# Patient Record
Sex: FEMALE | Race: OTHER | Employment: FULL TIME | URBAN - METROPOLITAN AREA
[De-identification: names, ages, dates, MRNs, and addresses within clinical notes are randomized per-mention and may not be internally consistent; named-entity substitution may affect disease eponyms.]

---

## 2021-04-11 PROBLEM — O99.891 BACTERIURIA IN PREGNANCY: Status: ACTIVE | Noted: 2021-04-11

## 2021-04-11 PROBLEM — Z34.01 ENCOUNTER FOR SUPERVISION OF LOW-RISK FIRST PREGNANCY IN FIRST TRIMESTER: Status: ACTIVE | Noted: 2021-04-11

## 2021-04-11 PROBLEM — R82.71 GBS BACTERIURIA: Status: ACTIVE | Noted: 2021-04-11

## 2021-04-11 PROBLEM — R82.71 BACTERIURIA IN PREGNANCY: Status: ACTIVE | Noted: 2021-04-11

## 2021-05-12 PROBLEM — O99.891 BACTERIURIA IN PREGNANCY: Status: RESOLVED | Noted: 2021-04-11 | Resolved: 2021-05-12

## 2021-05-12 PROBLEM — R82.71 GBS BACTERIURIA: Status: RESOLVED | Noted: 2021-04-11 | Resolved: 2021-05-12

## 2021-05-12 PROBLEM — R82.71 BACTERIURIA IN PREGNANCY: Status: RESOLVED | Noted: 2021-04-11 | Resolved: 2021-05-12

## 2021-06-30 PROBLEM — O09.92 HIGH-RISK PREGNANCY, SECOND TRIMESTER: Status: ACTIVE | Noted: 2021-04-11

## 2021-06-30 PROBLEM — O99.212 OBESITY AFFECTING PREGNANCY IN SECOND TRIMESTER: Status: ACTIVE | Noted: 2021-06-30

## 2021-07-29 PROBLEM — O24.410 DIET CONTROLLED WHITE CLASSIFICATION A1 GESTATIONAL DIABETES MELLITUS (GDM): Status: ACTIVE | Noted: 2021-07-29

## 2021-09-30 ENCOUNTER — HOSPITAL ENCOUNTER (INPATIENT)
Age: 26
LOS: 2 days | Discharge: HOME | End: 2021-10-02
Attending: OBSTETRICS & GYNECOLOGY | Admitting: OBSTETRICS & GYNECOLOGY
Payer: COMMERCIAL

## 2021-09-30 ENCOUNTER — ANESTHESIA EVENT (OUTPATIENT)
Dept: LABOR AND DELIVERY | Age: 26
End: 2021-09-30
Payer: COMMERCIAL

## 2021-09-30 ENCOUNTER — ANESTHESIA (OUTPATIENT)
Dept: LABOR AND DELIVERY | Age: 26
End: 2021-09-30
Payer: COMMERCIAL

## 2021-09-30 DIAGNOSIS — O09.92 HIGH-RISK PREGNANCY, SECOND TRIMESTER: ICD-10-CM

## 2021-09-30 DIAGNOSIS — Z98.891 STATUS POST CESAREAN SECTION: Primary | ICD-10-CM

## 2021-09-30 PROBLEM — R10.9 ABDOMINAL PAIN DURING PREGNANCY IN THIRD TRIMESTER: Status: ACTIVE | Noted: 2021-09-30

## 2021-09-30 PROBLEM — O26.893 ABDOMINAL PAIN DURING PREGNANCY IN THIRD TRIMESTER: Status: ACTIVE | Noted: 2021-09-30

## 2021-09-30 LAB
ABO + RH BLD: NORMAL
ARTERIAL PATENCY WRIST A: ABNORMAL
ARTERIAL PATENCY WRIST A: ABNORMAL
BASE DEFICIT BLD-SCNC: 1.9 MMOL/L
BASE EXCESS BLD CALC-SCNC: 0.6 MMOL/L
BLOOD GROUP ANTIBODIES SERPL: NORMAL
ERYTHROCYTE [DISTWIDTH] IN BLOOD BY AUTOMATED COUNT: 14.4 % (ref 11.9–14.6)
GAS FLOW.O2 O2 DELIVERY SYS: ABNORMAL L/MIN
GAS FLOW.O2 O2 DELIVERY SYS: ABNORMAL L/MIN
HCO3 BLD-SCNC: 27.1 MMOL/L (ref 22–26)
HCO3 BLDV-SCNC: 22.3 MMOL/L (ref 23–28)
HCT VFR BLD AUTO: 40.7 % (ref 35.8–46.3)
HGB BLD-MCNC: 13.5 G/DL (ref 11.7–15.4)
MCH RBC QN AUTO: 28.1 PG (ref 26.1–32.9)
MCHC RBC AUTO-ENTMCNC: 33.2 G/DL (ref 31.4–35)
MCV RBC AUTO: 84.6 FL (ref 79.6–97.8)
NRBC # BLD: 0 K/UL (ref 0–0.2)
PCO2 BLDCO: 36 MMHG (ref 32–68)
PCO2 BLDCO: 49 MMHG (ref 32–68)
PH BLDCO: 7.35 [PH] (ref 7.15–7.38)
PH BLDCO: 7.4 [PH] (ref 7.15–7.38)
PLATELET # BLD AUTO: 324 K/UL (ref 150–450)
PMV BLD AUTO: 9.6 FL (ref 9.4–12.3)
PO2 BLDCO: 11 MMHG
PO2 BLDCO: 28 MMHG
RBC # BLD AUTO: 4.81 M/UL (ref 4.05–5.2)
SAO2 % BLD: 9.7 % (ref 95–98)
SAO2 % BLDV: 53.7 % (ref 65–88)
SERVICE CMNT-IMP: ABNORMAL
SERVICE CMNT-IMP: ABNORMAL
SPECIMEN EXP DATE BLD: NORMAL
SPECIMEN TYPE: ABNORMAL
SPECIMEN TYPE: ABNORMAL
WBC # BLD AUTO: 12 K/UL (ref 4.3–11.1)

## 2021-09-30 PROCEDURE — 74011250636 HC RX REV CODE- 250/636: Performed by: REGISTERED NURSE

## 2021-09-30 PROCEDURE — 76060000078 HC EPIDURAL ANESTHESIA: Performed by: OBSTETRICS & GYNECOLOGY

## 2021-09-30 PROCEDURE — 74011250637 HC RX REV CODE- 250/637: Performed by: ANESTHESIOLOGY

## 2021-09-30 PROCEDURE — 77030011943: Performed by: OBSTETRICS & GYNECOLOGY

## 2021-09-30 PROCEDURE — 74011000250 HC RX REV CODE- 250: Performed by: ANESTHESIOLOGY

## 2021-09-30 PROCEDURE — 74011250636 HC RX REV CODE- 250/636: Performed by: OBSTETRICS & GYNECOLOGY

## 2021-09-30 PROCEDURE — 74011000250 HC RX REV CODE- 250: Performed by: OBSTETRICS & GYNECOLOGY

## 2021-09-30 PROCEDURE — 74011250636 HC RX REV CODE- 250/636: Performed by: ANESTHESIOLOGY

## 2021-09-30 PROCEDURE — 85027 COMPLETE CBC AUTOMATED: CPT

## 2021-09-30 PROCEDURE — 77030002933 HC SUT MCRYL J&J -A: Performed by: OBSTETRICS & GYNECOLOGY

## 2021-09-30 PROCEDURE — 76010000391 HC C SECN FIRST 1 HR: Performed by: OBSTETRICS & GYNECOLOGY

## 2021-09-30 PROCEDURE — A4300 CATH IMPL VASC ACCESS PORTAL: HCPCS | Performed by: ANESTHESIOLOGY

## 2021-09-30 PROCEDURE — 86901 BLOOD TYPING SEROLOGIC RH(D): CPT

## 2021-09-30 PROCEDURE — 77030034696 HC CATH URETH FOL 2W BARD -A

## 2021-09-30 PROCEDURE — 77030032490 HC SLV COMPR SCD KNE COVD -B: Performed by: OBSTETRICS & GYNECOLOGY

## 2021-09-30 PROCEDURE — 82803 BLOOD GASES ANY COMBINATION: CPT

## 2021-09-30 PROCEDURE — 59510 CESAREAN DELIVERY: CPT | Performed by: OBSTETRICS & GYNECOLOGY

## 2021-09-30 PROCEDURE — 77030014125 HC TY EPDRL BBMI -B: Performed by: ANESTHESIOLOGY

## 2021-09-30 PROCEDURE — 74011000258 HC RX REV CODE- 258: Performed by: OBSTETRICS & GYNECOLOGY

## 2021-09-30 PROCEDURE — 77030031139 HC SUT VCRL2 J&J -A: Performed by: OBSTETRICS & GYNECOLOGY

## 2021-09-30 PROCEDURE — 65270000029 HC RM PRIVATE

## 2021-09-30 PROCEDURE — 76010000392 HC C SECN EA ADDL 0.5 HR: Performed by: OBSTETRICS & GYNECOLOGY

## 2021-09-30 PROCEDURE — 75410000003 HC RECOV DEL/VAG/CSECN EA 0.5 HR: Performed by: OBSTETRICS & GYNECOLOGY

## 2021-09-30 PROCEDURE — 77030002966 HC SUT PDS J&J -A: Performed by: OBSTETRICS & GYNECOLOGY

## 2021-09-30 PROCEDURE — 74011000250 HC RX REV CODE- 250: Performed by: REGISTERED NURSE

## 2021-09-30 PROCEDURE — 99284 EMERGENCY DEPT VISIT MOD MDM: CPT | Performed by: OBSTETRICS & GYNECOLOGY

## 2021-09-30 PROCEDURE — 2709999900 HC NON-CHARGEABLE SUPPLY: Performed by: OBSTETRICS & GYNECOLOGY

## 2021-09-30 RX ORDER — BUTORPHANOL TARTRATE 2 MG/ML
1 INJECTION INTRAMUSCULAR; INTRAVENOUS
Status: DISCONTINUED | OUTPATIENT
Start: 2021-09-30 | End: 2021-09-30 | Stop reason: HOSPADM

## 2021-09-30 RX ORDER — SODIUM CHLORIDE 0.9 % (FLUSH) 0.9 %
5-40 SYRINGE (ML) INJECTION EVERY 8 HOURS
Status: DISCONTINUED | OUTPATIENT
Start: 2021-09-30 | End: 2021-10-01

## 2021-09-30 RX ORDER — LIDOCAINE HYDROCHLORIDE AND EPINEPHRINE 15; 5 MG/ML; UG/ML
INJECTION, SOLUTION EPIDURAL
Status: COMPLETED | OUTPATIENT
Start: 2021-09-30 | End: 2021-09-30

## 2021-09-30 RX ORDER — OXYTOCIN/RINGER'S LACTATE 30/500 ML
0-20 PLASTIC BAG, INJECTION (ML) INTRAVENOUS
Status: DISCONTINUED | OUTPATIENT
Start: 2021-09-30 | End: 2021-10-01

## 2021-09-30 RX ORDER — LIDOCAINE HYDROCHLORIDE 10 MG/ML
1 INJECTION INFILTRATION; PERINEURAL
Status: DISCONTINUED | OUTPATIENT
Start: 2021-09-30 | End: 2021-09-30 | Stop reason: HOSPADM

## 2021-09-30 RX ORDER — KETOROLAC TROMETHAMINE 30 MG/ML
INJECTION, SOLUTION INTRAMUSCULAR; INTRAVENOUS AS NEEDED
Status: DISCONTINUED | OUTPATIENT
Start: 2021-09-30 | End: 2021-09-30 | Stop reason: HOSPADM

## 2021-09-30 RX ORDER — ONDANSETRON 2 MG/ML
INJECTION INTRAMUSCULAR; INTRAVENOUS AS NEEDED
Status: DISCONTINUED | OUTPATIENT
Start: 2021-09-30 | End: 2021-09-30 | Stop reason: HOSPADM

## 2021-09-30 RX ORDER — CEFAZOLIN SODIUM/WATER 2 G/20 ML
2 SYRINGE (ML) INTRAVENOUS ONCE
Status: COMPLETED | OUTPATIENT
Start: 2021-09-30 | End: 2021-10-01

## 2021-09-30 RX ORDER — METOCLOPRAMIDE HYDROCHLORIDE 5 MG/ML
10 INJECTION INTRAMUSCULAR; INTRAVENOUS ONCE
Status: COMPLETED | OUTPATIENT
Start: 2021-09-30 | End: 2021-09-30

## 2021-09-30 RX ORDER — MORPHINE SULFATE 0.5 MG/ML
INJECTION, SOLUTION EPIDURAL; INTRATHECAL; INTRAVENOUS AS NEEDED
Status: DISCONTINUED | OUTPATIENT
Start: 2021-09-30 | End: 2021-09-30 | Stop reason: HOSPADM

## 2021-09-30 RX ORDER — LIDOCAINE HYDROCHLORIDE AND EPINEPHRINE 15; 5 MG/ML; UG/ML
INJECTION, SOLUTION EPIDURAL AS NEEDED
Status: DISCONTINUED | OUTPATIENT
Start: 2021-09-30 | End: 2021-09-30 | Stop reason: HOSPADM

## 2021-09-30 RX ORDER — OXYTOCIN/RINGER'S LACTATE 30/500 ML
87.3 PLASTIC BAG, INJECTION (ML) INTRAVENOUS AS NEEDED
Status: DISCONTINUED | OUTPATIENT
Start: 2021-09-30 | End: 2021-10-01

## 2021-09-30 RX ORDER — SODIUM CHLORIDE 0.9 % (FLUSH) 0.9 %
5-40 SYRINGE (ML) INJECTION AS NEEDED
Status: DISCONTINUED | OUTPATIENT
Start: 2021-09-30 | End: 2021-10-01

## 2021-09-30 RX ORDER — MINERAL OIL
120 OIL (ML) ORAL
Status: DISCONTINUED | OUTPATIENT
Start: 2021-09-30 | End: 2021-09-30 | Stop reason: HOSPADM

## 2021-09-30 RX ORDER — TRISODIUM CITRATE DIHYDRATE AND CITRIC ACID MONOHYDRATE 500; 334 MG/5ML; MG/5ML
30 SOLUTION ORAL ONCE
Status: COMPLETED | OUTPATIENT
Start: 2021-09-30 | End: 2021-09-30

## 2021-09-30 RX ORDER — OXYTOCIN/RINGER'S LACTATE 30/500 ML
10 PLASTIC BAG, INJECTION (ML) INTRAVENOUS AS NEEDED
Status: DISCONTINUED | OUTPATIENT
Start: 2021-09-30 | End: 2021-10-01

## 2021-09-30 RX ORDER — SODIUM CHLORIDE, SODIUM LACTATE, POTASSIUM CHLORIDE, CALCIUM CHLORIDE 600; 310; 30; 20 MG/100ML; MG/100ML; MG/100ML; MG/100ML
INJECTION, SOLUTION INTRAVENOUS
Status: DISCONTINUED | OUTPATIENT
Start: 2021-09-30 | End: 2021-09-30 | Stop reason: HOSPADM

## 2021-09-30 RX ORDER — ROPIVACAINE HYDROCHLORIDE 2 MG/ML
INJECTION, SOLUTION EPIDURAL; INFILTRATION; PERINEURAL
Status: DISCONTINUED | OUTPATIENT
Start: 2021-09-30 | End: 2021-09-30 | Stop reason: HOSPADM

## 2021-09-30 RX ORDER — DEXTROSE, SODIUM CHLORIDE, SODIUM LACTATE, POTASSIUM CHLORIDE, AND CALCIUM CHLORIDE 5; .6; .31; .03; .02 G/100ML; G/100ML; G/100ML; G/100ML; G/100ML
125 INJECTION, SOLUTION INTRAVENOUS CONTINUOUS
Status: DISCONTINUED | OUTPATIENT
Start: 2021-09-30 | End: 2021-10-01

## 2021-09-30 RX ORDER — ONDANSETRON 2 MG/ML
4 INJECTION INTRAMUSCULAR; INTRAVENOUS
Status: DISCONTINUED | OUTPATIENT
Start: 2021-09-30 | End: 2021-09-30 | Stop reason: HOSPADM

## 2021-09-30 RX ORDER — EPHEDRINE SULFATE/0.9% NACL/PF 50 MG/5 ML
SYRINGE (ML) INTRAVENOUS AS NEEDED
Status: DISCONTINUED | OUTPATIENT
Start: 2021-09-30 | End: 2021-09-30 | Stop reason: HOSPADM

## 2021-09-30 RX ORDER — LIDOCAINE HYDROCHLORIDE 20 MG/ML
JELLY TOPICAL
Status: DISCONTINUED | OUTPATIENT
Start: 2021-09-30 | End: 2021-09-30 | Stop reason: HOSPADM

## 2021-09-30 RX ORDER — CALCIUM CARBONATE 200(500)MG
400 TABLET,CHEWABLE ORAL
Status: DISCONTINUED | OUTPATIENT
Start: 2021-09-30 | End: 2021-09-30 | Stop reason: HOSPADM

## 2021-09-30 RX ORDER — ROPIVACAINE HYDROCHLORIDE 2 MG/ML
INJECTION, SOLUTION EPIDURAL; INFILTRATION; PERINEURAL AS NEEDED
Status: DISCONTINUED | OUTPATIENT
Start: 2021-09-30 | End: 2021-09-30 | Stop reason: HOSPADM

## 2021-09-30 RX ORDER — OXYTOCIN/RINGER'S LACTATE 30/500 ML
PLASTIC BAG, INJECTION (ML) INTRAVENOUS
Status: DISCONTINUED | OUTPATIENT
Start: 2021-09-30 | End: 2021-09-30 | Stop reason: HOSPADM

## 2021-09-30 RX ADMIN — MORPHINE SULFATE 1 MG: 0.5 INJECTION, SOLUTION EPIDURAL; INTRATHECAL; INTRAVENOUS at 21:28

## 2021-09-30 RX ADMIN — SODIUM CITRATE AND CITRIC ACID MONOHYDRATE 30 ML: 500; 334 SOLUTION ORAL at 20:54

## 2021-09-30 RX ADMIN — OXYTOCIN 500 ML/HR: 10 INJECTION, SOLUTION INTRAMUSCULAR; INTRAVENOUS at 21:23

## 2021-09-30 RX ADMIN — Medication 1 MILLI-UNITS/MIN: at 15:54

## 2021-09-30 RX ADMIN — SODIUM CHLORIDE, SODIUM LACTATE, POTASSIUM CHLORIDE, AND CALCIUM CHLORIDE: 600; 310; 30; 20 INJECTION, SOLUTION INTRAVENOUS at 21:04

## 2021-09-30 RX ADMIN — PHENYLEPHRINE HYDROCHLORIDE 160 MCG: 10 INJECTION INTRAVENOUS at 21:38

## 2021-09-30 RX ADMIN — Medication 10 MG: at 18:27

## 2021-09-30 RX ADMIN — CEFAZOLIN SODIUM 2 G: 10 INJECTION, POWDER, FOR SOLUTION INTRAVENOUS at 20:54

## 2021-09-30 RX ADMIN — LIDOCAINE HYDROCHLORIDE AND EPINEPHRINE 7 ML: 15; 5 INJECTION, SOLUTION EPIDURAL at 21:09

## 2021-09-30 RX ADMIN — LIDOCAINE HYDROCHLORIDE,EPINEPHRINE BITARTRATE 3 ML: 15; .005 INJECTION, SOLUTION EPIDURAL; INFILTRATION; INTRACAUDAL; PERINEURAL at 18:02

## 2021-09-30 RX ADMIN — ROPIVACAINE HYDROCHLORIDE 10 ML/HR: 2 INJECTION, SOLUTION EPIDURAL; INFILTRATION; PERINEURAL at 18:15

## 2021-09-30 RX ADMIN — SODIUM CHLORIDE 2.5 MILLION UNITS: 9 INJECTION, SOLUTION INTRAVENOUS at 19:54

## 2021-09-30 RX ADMIN — Medication 8 ML: at 18:10

## 2021-09-30 RX ADMIN — Medication 5 MG: at 18:29

## 2021-09-30 RX ADMIN — MORPHINE SULFATE 1 MG: 0.5 INJECTION, SOLUTION EPIDURAL; INTRATHECAL; INTRAVENOUS at 21:43

## 2021-09-30 RX ADMIN — AZITHROMYCIN MONOHYDRATE 500 MG: 500 INJECTION, POWDER, LYOPHILIZED, FOR SOLUTION INTRAVENOUS at 20:54

## 2021-09-30 RX ADMIN — PHENYLEPHRINE HYDROCHLORIDE 160 MCG: 10 INJECTION INTRAVENOUS at 21:15

## 2021-09-30 RX ADMIN — SODIUM CHLORIDE, SODIUM LACTATE, POTASSIUM CHLORIDE, AND CALCIUM CHLORIDE: 600; 310; 30; 20 INJECTION, SOLUTION INTRAVENOUS at 21:07

## 2021-09-30 RX ADMIN — KETOROLAC TROMETHAMINE 30 MG: 30 INJECTION, SOLUTION INTRAMUSCULAR; INTRAVENOUS at 21:51

## 2021-09-30 RX ADMIN — PHENYLEPHRINE HYDROCHLORIDE 160 MCG: 10 INJECTION INTRAVENOUS at 21:49

## 2021-09-30 RX ADMIN — SODIUM CHLORIDE 5 MILLION UNITS: 900 INJECTION INTRAVENOUS at 15:48

## 2021-09-30 RX ADMIN — ONDANSETRON 4 MG: 2 INJECTION INTRAMUSCULAR; INTRAVENOUS at 21:43

## 2021-09-30 RX ADMIN — PHENYLEPHRINE HYDROCHLORIDE 160 MCG: 10 INJECTION INTRAVENOUS at 21:30

## 2021-09-30 RX ADMIN — METOCLOPRAMIDE 10 MG: 5 INJECTION, SOLUTION INTRAMUSCULAR; INTRAVENOUS at 20:54

## 2021-09-30 RX ADMIN — SODIUM CHLORIDE, SODIUM LACTATE, POTASSIUM CHLORIDE, CALCIUM CHLORIDE, AND DEXTROSE MONOHYDRATE 125 ML/HR: 600; 310; 30; 20; 5 INJECTION, SOLUTION INTRAVENOUS at 15:50

## 2021-09-30 RX ADMIN — MORPHINE SULFATE 1 MG: 0.5 INJECTION, SOLUTION EPIDURAL; INTRATHECAL; INTRAVENOUS at 21:35

## 2021-09-30 RX ADMIN — Medication 2 MILLI-UNITS/MIN: at 16:33

## 2021-09-30 RX ADMIN — LIDOCAINE HYDROCHLORIDE AND EPINEPHRINE 5 ML: 15; 5 INJECTION, SOLUTION EPIDURAL at 21:02

## 2021-09-30 NOTE — PROGRESS NOTES
Anesthesia in room. Procedure reviewed. Consents signed per MD.   1800 Pt to side of bed.   1807 Epidural cath in place. Test dose per MD. Serial BP as documented. 1815 Pt to right hip tilt.

## 2021-09-30 NOTE — PROGRESS NOTES
1845 MD updated on FHR tracing with continued variables with contractions and moderate variability between contractions. . OK to place toure. Pt tolerated well. SVE as documented.

## 2021-09-30 NOTE — ANESTHESIA PROCEDURE NOTES
Epidural Block    Patient location during procedure: OB  Start time: 9/30/2021 6:02 PM  End time: 9/30/2021 6:07 PM  Reason for block: labor epidural  Staffing  Performed: attending   Anesthesiologist: Dexter Carson MD  Preanesthetic Checklist  Completed: patient identified, IV checked, site marked, risks and benefits discussed, surgical consent, monitors and equipment checked, pre-op evaluation and timeout performed  Block Placement  Patient position: sitting  Prep: ChloraPrep  Sterility prep: mask and hand  Sedation level: no sedation  Patient monitoring: frequent blood pressure checks, heart rate and continuous pulse oximetry  Approach: right paramedian  Location: lumbar  Lumbar location: L2-L3  Epidural  Loss of resistance technique: air  Guidance: landmark technique  Needle  Needle type: Tuohy   Needle gauge: 17 G  Needle insertion depth: 4 cm  Catheter type: end hole  Catheter size: 19 G  Catheter at skin depth: 10 cm  Catheter securement method: surgical tape, liquid medical adhesive and clear occlusive dressing  Test dose: negative  Medications Administered  Lidocaine-EPINEPHrine (XYLOCAINE) 1.5 %-1:200,000 Epidural, 3 mL  Assessment  Block outcome: pain improved  Procedure assessment: patient tolerated procedure well with no immediate complications  Additional Notes  TO 1800

## 2021-09-30 NOTE — H&P
History & Physical    Name: Dodie Nice MRN: [id number]  SSN: [id number]    YOB: 1995  Age: 23 y.o. Sex: female        Subjective: Ctxs  24 yo G1 at 37wks presents c/o ctxs. Denies lof or vb. Reports normal FM. Pregnancy has been c/b GBS positivity, GDM diet controlled, and obesity. Estimated Date of Delivery: 21  OB History    Para Term  AB Living   1 0 0 0 0 0   SAB TAB Ectopic Molar Multiple Live Births   0 0 0   0        # Outcome Date GA Lbr Bruno/2nd Weight Sex Delivery Anes PTL Lv   1 Current                History reviewed. No pertinent past medical history. Past Surgical History:   Procedure Laterality Date    HX WISDOM TEETH EXTRACTION       Social History     Occupational History    Not on file   Tobacco Use    Smoking status: Never Smoker    Smokeless tobacco: Never Used   Vaping Use    Vaping Use: Never used   Substance and Sexual Activity    Alcohol use: Not Currently    Drug use: Not Currently     Comment: last smoked marijuana before + UCG    Sexual activity: Yes     Partners: Male     Birth control/protection: None     Family History   Problem Relation Age of Onset    Hypertension Father     Hypertension Paternal Grandfather     Cancer Paternal Grandfather         skin    Ovarian Cancer Neg Hx     Colon Cancer Neg Hx     Breast Cancer Neg Hx        No Known Allergies  Prior to Admission medications    Medication Sig Start Date End Date Taking? Authorizing Provider   famotidine (PEPCID) 10 mg tablet Take 10 mg by mouth two (2) times a day. Yes Provider, Historical   prenatal vit/iron fum/folic ac (PRENATAL 1+1 PO) Take  by mouth. Yes Provider, Historical   BIOTIN PO Take  by mouth. Yes Provider, Historical   calcium-cholecalciferol, d3, (CALCIUM 600 + D) 600-125 mg-unit tab Take  by mouth. Yes Provider, Historical   lancets misc Check blood sugar 4 x/day.  Fasting before breakfast then 1 hour after the 1st bite of each meal (breakfast,lunch,dinner). 8/4/21   Pickell, Morrell Paget, MD   glucose blood VI test strips (blood glucose test) strip Check blood sugars 4 times a day. Fasting before breakfast, then 1 hour after the first bite of each meal.     8/4/21   Alex Barreto MD   Blood-Glucose Meter monitoring kit Check blood sugar 4 x/day. Fasting before breakfast then 1 hour after the 1st bite of meal (breakfast,lunch,dinner). 8/4/21   Alex Barreto MD        Review of Systems: Pertinent items are noted in HPI. 10 point ROS is otherwise positive for general discomforts in labor. Objective:     Vitals:  Vitals:    09/30/21 1418 09/30/21 1420   BP: 123/69    Temp: 98.2 °F (36.8 °C)    Weight:  99.3 kg (219 lb)   Height:  5' 5\" (1.651 m)        Physical Exam:  Patient without distress. Heart: Regular rate and rhythm  Lung: clear to auscultation throughout lung fields and normal respiratory effort  Abdomen: soft, nontender  Fundus: soft and non tender  Perineum: blood absent, amniotic fluid absent  Cervical Exam: 3 cm dilated    90% effaced    -3 station    Lower Extremities:  - Edema 1+  Membranes:  Intact  Fetal Heart Rate: Baseline: 115-125 per minute  Variability: moderate  Accelerations: yes  Decelerations: yes, 1 min to 90s  Uterine contractions: irregular    Prenatal Labs:   Lab Results   Component Value Date/Time    GrBStrep, External POSITIVE 03/09/2021 12:00 AM         Assessment/Plan:  22 yo G1 at 40 wks presents with ctxs. Cvx unchanged from prior exam. I reviewed and interpreted the NST as a category 2 tracing. Active Problems:    * No active hospital problems. *       Plan:   Spontaneous decel x 1 minute noted and reviewed with . Agreed to admit for augmentation of labor. Start pitocin. Epidural prn. Glucose q 2 hours in labor; treat if greater than 150. PCN per IV for GBS prophylaxis.

## 2021-09-30 NOTE — PROGRESS NOTES
Chart reviewed and met with patient to discuss plan of IOL. Will start pitocin and plan for AROM. Last growth baby growing large with AC > 90 and EFW of 7#11oz. GBS + start PCN.     Marge Kimble, DO

## 2021-09-30 NOTE — PROGRESS NOTES
SVE cat 2 with occasional variable, but overall reassuring   SVE 5/90/-2, AROM with scant fluid. FSE placed. Continue pitocin.      Marge Kimble, DO

## 2021-09-30 NOTE — PROGRESS NOTES
PT admitted for labor in room 432. IV started, labs drawn and sent. Plan of care reviewed. Pt and  verbalized understanding. Consent signed for Blood Products if needed.

## 2021-10-01 LAB
HCT VFR BLD AUTO: 33.6 % (ref 35.8–46.3)
HGB BLD-MCNC: 10.7 G/DL (ref 11.7–15.4)

## 2021-10-01 PROCEDURE — 36415 COLL VENOUS BLD VENIPUNCTURE: CPT

## 2021-10-01 PROCEDURE — 85018 HEMOGLOBIN: CPT

## 2021-10-01 PROCEDURE — 74011250637 HC RX REV CODE- 250/637: Performed by: OBSTETRICS & GYNECOLOGY

## 2021-10-01 PROCEDURE — 65270000029 HC RM PRIVATE

## 2021-10-01 PROCEDURE — 74011250636 HC RX REV CODE- 250/636: Performed by: ANESTHESIOLOGY

## 2021-10-01 PROCEDURE — 74011250637 HC RX REV CODE- 250/637: Performed by: ANESTHESIOLOGY

## 2021-10-01 PROCEDURE — 2709999900 HC NON-CHARGEABLE SUPPLY

## 2021-10-01 PROCEDURE — 74011000250 HC RX REV CODE- 250: Performed by: ANESTHESIOLOGY

## 2021-10-01 RX ORDER — OXYCODONE HYDROCHLORIDE 5 MG/1
10 TABLET ORAL
Status: DISCONTINUED | OUTPATIENT
Start: 2021-10-01 | End: 2021-10-02 | Stop reason: HOSPADM

## 2021-10-01 RX ORDER — ACETAMINOPHEN 325 MG/1
650 TABLET ORAL
Status: DISCONTINUED | OUTPATIENT
Start: 2021-10-01 | End: 2021-10-02 | Stop reason: HOSPADM

## 2021-10-01 RX ORDER — DIPHENHYDRAMINE HYDROCHLORIDE 50 MG/ML
12.5 INJECTION, SOLUTION INTRAMUSCULAR; INTRAVENOUS
Status: DISCONTINUED | OUTPATIENT
Start: 2021-10-01 | End: 2021-10-01 | Stop reason: ALTCHOICE

## 2021-10-01 RX ORDER — DOCUSATE SODIUM 100 MG/1
100 CAPSULE, LIQUID FILLED ORAL 2 TIMES DAILY
Status: DISCONTINUED | OUTPATIENT
Start: 2021-10-01 | End: 2021-10-02 | Stop reason: HOSPADM

## 2021-10-01 RX ORDER — SODIUM CHLORIDE 0.9 % (FLUSH) 0.9 %
5-40 SYRINGE (ML) INJECTION EVERY 8 HOURS
Status: DISCONTINUED | OUTPATIENT
Start: 2021-10-01 | End: 2021-10-01 | Stop reason: ALTCHOICE

## 2021-10-01 RX ORDER — NALOXONE HYDROCHLORIDE 0.4 MG/ML
0.1 INJECTION, SOLUTION INTRAMUSCULAR; INTRAVENOUS; SUBCUTANEOUS
Status: DISCONTINUED | OUTPATIENT
Start: 2021-10-01 | End: 2021-10-01 | Stop reason: ALTCHOICE

## 2021-10-01 RX ORDER — NALOXONE HYDROCHLORIDE 0.4 MG/ML
0.4 INJECTION, SOLUTION INTRAMUSCULAR; INTRAVENOUS; SUBCUTANEOUS AS NEEDED
Status: DISCONTINUED | OUTPATIENT
Start: 2021-10-01 | End: 2021-10-02 | Stop reason: HOSPADM

## 2021-10-01 RX ORDER — SIMETHICONE 80 MG
80 TABLET,CHEWABLE ORAL
Status: DISCONTINUED | OUTPATIENT
Start: 2021-10-01 | End: 2021-10-02 | Stop reason: HOSPADM

## 2021-10-01 RX ORDER — SODIUM CHLORIDE, SODIUM LACTATE, POTASSIUM CHLORIDE, CALCIUM CHLORIDE 600; 310; 30; 20 MG/100ML; MG/100ML; MG/100ML; MG/100ML
50 INJECTION, SOLUTION INTRAVENOUS CONTINUOUS
Status: DISCONTINUED | OUTPATIENT
Start: 2021-10-01 | End: 2021-10-01 | Stop reason: ALTCHOICE

## 2021-10-01 RX ORDER — HYDROMORPHONE HYDROCHLORIDE 1 MG/ML
1 INJECTION, SOLUTION INTRAMUSCULAR; INTRAVENOUS; SUBCUTANEOUS
Status: DISCONTINUED | OUTPATIENT
Start: 2021-10-01 | End: 2021-10-01 | Stop reason: ALTCHOICE

## 2021-10-01 RX ORDER — ONDANSETRON 4 MG/1
4 TABLET, ORALLY DISINTEGRATING ORAL
Status: DISCONTINUED | OUTPATIENT
Start: 2021-10-01 | End: 2021-10-02 | Stop reason: HOSPADM

## 2021-10-01 RX ORDER — KETOROLAC TROMETHAMINE 30 MG/ML
30 INJECTION, SOLUTION INTRAMUSCULAR; INTRAVENOUS
Status: DISCONTINUED | OUTPATIENT
Start: 2021-10-01 | End: 2021-10-01 | Stop reason: ALTCHOICE

## 2021-10-01 RX ORDER — DIPHENHYDRAMINE HCL 25 MG
25 CAPSULE ORAL
Status: DISCONTINUED | OUTPATIENT
Start: 2021-10-01 | End: 2021-10-02 | Stop reason: HOSPADM

## 2021-10-01 RX ORDER — OXYCODONE HYDROCHLORIDE 5 MG/1
10 TABLET ORAL
Status: DISCONTINUED | OUTPATIENT
Start: 2021-10-01 | End: 2021-10-01 | Stop reason: ALTCHOICE

## 2021-10-01 RX ORDER — ACETAMINOPHEN 500 MG
1000 TABLET ORAL
Status: DISCONTINUED | OUTPATIENT
Start: 2021-10-01 | End: 2021-10-01 | Stop reason: ALTCHOICE

## 2021-10-01 RX ORDER — SODIUM CHLORIDE 0.9 % (FLUSH) 0.9 %
5-40 SYRINGE (ML) INJECTION AS NEEDED
Status: DISCONTINUED | OUTPATIENT
Start: 2021-10-01 | End: 2021-10-02 | Stop reason: HOSPADM

## 2021-10-01 RX ORDER — IBUPROFEN 600 MG/1
600 TABLET ORAL
Status: DISCONTINUED | OUTPATIENT
Start: 2021-10-01 | End: 2021-10-02 | Stop reason: HOSPADM

## 2021-10-01 RX ADMIN — ACETAMINOPHEN 1000 MG: 500 TABLET, FILM COATED ORAL at 18:38

## 2021-10-01 RX ADMIN — KETOROLAC TROMETHAMINE 30 MG: 30 INJECTION, SOLUTION INTRAMUSCULAR; INTRAVENOUS at 10:43

## 2021-10-01 RX ADMIN — SIMETHICONE 80 MG: 80 TABLET, CHEWABLE ORAL at 22:30

## 2021-10-01 RX ADMIN — ACETAMINOPHEN 1000 MG: 500 TABLET, FILM COATED ORAL at 12:28

## 2021-10-01 RX ADMIN — PROMETHAZINE HYDROCHLORIDE 12.5 MG: 25 INJECTION INTRAMUSCULAR; INTRAVENOUS at 02:15

## 2021-10-01 RX ADMIN — KETOROLAC TROMETHAMINE 30 MG: 30 INJECTION, SOLUTION INTRAMUSCULAR; INTRAVENOUS at 17:01

## 2021-10-01 RX ADMIN — DOCUSATE SODIUM 100 MG: 100 CAPSULE, LIQUID FILLED ORAL at 22:30

## 2021-10-01 RX ADMIN — OXYCODONE 10 MG: 5 TABLET ORAL at 08:27

## 2021-10-01 RX ADMIN — ACETAMINOPHEN 1000 MG: 500 TABLET, FILM COATED ORAL at 06:02

## 2021-10-01 RX ADMIN — SODIUM CHLORIDE, SODIUM LACTATE, POTASSIUM CHLORIDE, AND CALCIUM CHLORIDE 50 ML/HR: 600; 310; 30; 20 INJECTION, SOLUTION INTRAVENOUS at 01:28

## 2021-10-01 RX ADMIN — OXYCODONE 10 MG: 5 TABLET ORAL at 01:10

## 2021-10-01 RX ADMIN — OXYCODONE 10 MG: 5 TABLET ORAL at 22:30

## 2021-10-01 RX ADMIN — KETOROLAC TROMETHAMINE 30 MG: 30 INJECTION, SOLUTION INTRAMUSCULAR; INTRAVENOUS at 04:28

## 2021-10-01 RX ADMIN — OXYCODONE 10 MG: 5 TABLET ORAL at 18:38

## 2021-10-01 NOTE — PROGRESS NOTES
Pt up to bathroom. Pt states she passed a clot in the toilet, possibly size smaller then a golf ball. Will continue monitor closely. Pt voided a small amount of urine, but unable to measure. Educated patient on how to measure output when she gets up the next time.

## 2021-10-01 NOTE — PROGRESS NOTES
Results for Haley Islas (MRN [id number]) as of 9/30/2021 21:41   Ref.  Range 9/30/2021 21:31 9/30/2021 21:32   pCO2 cord blood Latest Ref Range: 32 - 68 mmHg 49 36   pO2 cord blood Latest Units: mmHg 11 28   HCO3 (POC) Latest Ref Range: 22 - 26 MMOL/L 27.1 (H)    sO2 (POC) Latest Ref Range: 95 - 98 % 9.7 (L)    Base deficit (POC) Latest Units: mmol/L  1.9   Base excess (POC) Latest Units: mmol/L 0.6    Specimen type (POC) Latest Units:   ARTERIAL CORD VENOUS CORD   pH, cord blood (POC) Latest Ref Range: 7.15 - 7.38   7.35 7.40 (H)   HCO3, venous (POC) Latest Ref Range: 23 - 28 MMOL/L  22.3 (L)   sO2, venous (POC) Latest Ref Range: 65 - 88 %  53.7 (L)   Device: Latest Units:   ROOM AIR ROOM AIR   Allens test (POC) Latest Units:   NOT APPLICABLE NOT APPLICABLE

## 2021-10-01 NOTE — OP NOTES
Section Delivery Operative Report       Patient: Leah Khan MRN: [id number]  SSN: [id number]    YOB: 1995  Age: 23 y.o. Sex: female       Date of Procedure: 2021     Preoperative Diagnosis: fetal intolerance of labor with inability to augment     Postoperative Diagnosis: Same as pre-op with delivery of viable male infant    Procedure: Primary low transverse C/S    Surgeon(s):  Marge Kimble DO    Indication: NRFHTs remote from delivery and inability to augment     Anesthesia: Spinal    Prophylactic Antibiotics: Ancef and azithromycin     EBL: 475 ml      UOP: 325cc     Information for the patient's :  Erroll Prow [[id number]]   Delivery of a 7 lb 6.9 oz (3.37 kg) male infant on 2021 at 9:22 PM. Apgars were 8  and 9 . Umbilical Cord: 3 Vessels     Umbilical Cord Events:       Placenta: Spontaneous removal with Normal appearance. Amniotic Fluid Volume:  Scant     Amniotic Fluid Description:  Clear       Specimens:   ID Type Source Tests Collected by Time Destination   1 : Placenta. . 40w. Male Placenta Uterus  Sarah Hines DO 2021 Discarded               Complications:  none    Findings: Normal bilateral ovaries, tubes. Uterus with stable 2 cm hematoma on anterior uterus, inferior to hysterotomy. This was observed and non-expanding. Procedure Details:    Patient was taken to the OR after informed consent had been obtained. After anesthesia was found to be adequate, she was then placed in a dorsal supine position with a left lateral tilt. She was then prepped and draped in a sterile fashion. Time out was completed. Attention was turned to the abdomen and an Allis test confirmed adequate anesthesia. A Pfannenstiel skin incision was made with the scalpel down to the fascia. The fascia was knicked in the midline. The incision was extended laterally using monae scissors.   The cephalad fascia was grasped with kochers and the rectus muscles  off both bluntly and sharply. Rectus muscles were  bluntly. The peritoneum was entered bluntly. The bladder blade was inserted. A low transverse incision was made on the uterus in the midline. The uterine incision was extended cephalad-caudad bluntly. The fetus was delivered from a cephalic  position through the uterine incision. The cord was doubly clamped and transected. The infant was handed off to the awaiting RNs. The placenta was removed by fundal massage and traction on the cord. The uterus was cleaned with a lap pad. The uterine incision was closed with 1 Vicryl suture in a running interlocking fashion. The second layer was imbricated over the first layer  with 1 Vicryl suture in a running non-locking  fashion. Uterus was replaced into the abdomen. The fascia was closed in a running non interlocking fashion using #1 Vicryl suture. The subcuticular layers were reapproximated with 2-0 plain gut in running fashion. The skin was closed with a 4-0 Monocryl in a subcuticular fashion. The patient tolerated the procedure well. Sponge, lap, and needle counts were correct times three and the patient and baby were taken to recovery/postpartum room in stable condition.       Signed By: Christina Barlow DO     September 30, 2021

## 2021-10-01 NOTE — LACTATION NOTE
This note was copied from a baby's chart. In to see mom and infant for the first time. Mom placed infant to her right breast in the cross cradle hold. Infant very sleepy so I instructed mom on how to \"burp\" infant to wake him. He did show hunger cues so mom ;placed him to her right breast in the cross cradle hold. Infant did latch and sucked for 5 plus minutes on and off the breast. Mom also attempted to latch infant on her left breast in the same hold. Infant latched took a few sucks and fell asleep. Reviewed with mom and dad the expectations of the first 24 hour as well as the second night. Lactation consultant will follow up tomorrow.

## 2021-10-01 NOTE — PROGRESS NOTES
Patient with prolonged deceleration x 3 minutes. Overall great recovery and moderate variability, but decelerations have been increasing in frequency. Patient is G1 at 5 cm and remote from delivery. Pitocin has been off x 1 hour and decelerations have continued. Discussed proceeding with C/S with patient and patient agreeable. Patient was counseled on risk of bleeding, infection, damage to nearby organs. We discussed she can try vaginal delivery in the future. Counseled on risk of lifesaving c-hysterectomy.      Marge Kimble, DO

## 2021-10-01 NOTE — PROGRESS NOTES
Anesthesiology  Post-op Note    Post-op day 1 s/p  via epidural with neuraxial opioids for post-op pain management. Visit Vitals  /60 (BP 1 Location: Right arm, BP Patient Position: Supine)   Pulse 76   Temp 37.2 °C (98.9 °F)   Resp 18   Ht 5' 5\" (1.651 m)   Wt 99.3 kg (219 lb)   LMP 2020 (Exact Date)   SpO2 96%   Breastfeeding Unknown   BMI 36.44 kg/m²         Airway patent, patient appropriately hydrated and appears euvolemic. Patient is Alert and oriented. Pain is well controlled. Pruritus is moderately controlled. Nausea is absent. No complaints about back or site of injection. Motor and sensory function has returned to baseline in lower extremities. Patient is satisfied with anesthetic and reports no complications. Continue current orders, then initiate surgeon's orders for pain management 24 hours after . Follow up per surgeon.

## 2021-10-01 NOTE — LACTATION NOTE

## 2021-10-01 NOTE — PROGRESS NOTES
Oxycodone 10 mg PO given to pt per request.  Educated pt to call out if pain medication does not help. Pt given scheduled Tylenol 100 mg PO.

## 2021-10-01 NOTE — PROGRESS NOTES
Pt up to bathroom with RN assistance. Vale-care completed and taught. Educated mother on using peribottle. Gown changed. Linens changed. Pt verbalizes understanding.

## 2021-10-01 NOTE — PROGRESS NOTES
Post-Operative Day 1 Progress Note  Sebastian Serrano  [id number]    Patient doing well post-op day 1 from  delivery without significant complaints. Pain controlled on current medication. Normal lochia. Vitals:  Patient Vitals for the past 8 hrs:   BP Temp Pulse Resp SpO2   10/01/21 0900 (!) 111/54 99.3 °F (37.4 °C) 73 18 --   10/01/21 0708 106/60 98.9 °F (37.2 °C) 76 18 96 %   10/01/21 0428 (!) 107/57 98.8 °F (37.1 °C) 79 18 96 %     Temp (24hrs), Av.5 °F (36.9 °C), Min:97.7 °F (36.5 °C), Max:99.3 °F (37.4 °C)      Vital signs stable, afebrile. Exam:  Patient without distress. Abdomen soft, fundus firm at level of umbilicus, nontender.   Bandage dry                 Hill in place-clear urine                SCDs on     Labs:   Recent Results (from the past 24 hour(s))   CBC W/O DIFF    Collection Time: 21  3:28 PM   Result Value Ref Range    WBC 12.0 (H) 4.3 - 11.1 K/uL    RBC 4.81 4.05 - 5.2 M/uL    HGB 13.5 11.7 - 15.4 g/dL    HCT 40.7 35.8 - 46.3 %    MCV 84.6 79.6 - 97.8 FL    MCH 28.1 26.1 - 32.9 PG    MCHC 33.2 31.4 - 35.0 g/dL    RDW 14.4 11.9 - 14.6 %    PLATELET 597 003 - 135 K/uL    MPV 9.6 9.4 - 12.3 FL    ABSOLUTE NRBC 0.00 0.0 - 0.2 K/uL   TYPE & SCREEN    Collection Time: 21  3:28 PM   Result Value Ref Range    Crossmatch Expiration 10/03/2021,2359     ABO/Rh(D) Rhoda Trevizo POSITIVE     Antibody screen NEG    BLOOD GAS, CORD BLOOD    Collection Time: 21  9:31 PM   Result Value Ref Range    Device: ROOM AIR      pH, cord blood (POC) 7.35 7.15 - 7.38      pCO2 cord blood 49 32 - 68 mmHg    pO2 cord blood 11 mmHg    HCO3 (POC) 27.1 (H) 22 - 26 MMOL/L    sO2 (POC) 9.7 (L) 95 - 98 %    Base excess (POC) 0.6 mmol/L    Allens test (POC) NOT APPLICABLE      Specimen type (POC) ARTERIAL CORD      Performed by Suraj    BLOOD GAS, CORD BLOOD    Collection Time: 21  9:32 PM   Result Value Ref Range Device: ROOM AIR      pH, cord blood (POC) 7.40 (H) 7.15 - 7.38      pCO2 cord blood 36 32 - 68 mmHg    pO2 cord blood 28 mmHg    HCO3, venous (POC) 22.3 (L) 23 - 28 MMOL/L    sO2, venous (POC) 53.7 (L) 65 - 88 %    Base deficit (POC) 1.9 mmol/L    Allens test (POC) NOT APPLICABLE      Specimen type (POC) VENOUS CORD      Performed by Suraj    HGB & HCT    Collection Time: 10/01/21  6:55 AM   Result Value Ref Range    HGB 10.7 (L) 11.7 - 15.4 g/dL    HCT 33.6 (L) 35.8 - 46.3 %       Assessment and Plan:  Patient appears to be having uncomplicated post- course. Continue routine post-op care and maternal education.

## 2021-10-01 NOTE — PROGRESS NOTES
SBAR OUT Report: Mother    Verbal report given to Steve Srivastava RN (full name & credentials) on this patient, who is now being transferred to MIU (unit) for routine progression of care. The patient is wearing a green \"Anesthesia-Duramorph\" band. Report consisted of patient's Situation, Background, Assessment and Recommendations (SBAR). [place] ID bands were compared with the identification form, and verified with the patient and receiving nurse. Information from the SBAR and Kardex and the [employer] Report was reviewed with the receiving nurse; opportunity for questions and clarification provided.

## 2021-10-01 NOTE — ANESTHESIA POSTPROCEDURE EVALUATION
Procedure(s):   SECTION.     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

## 2021-10-01 NOTE — PROGRESS NOTES
Chart reviewed - first time parent. SW met with patient/ while social distancing w/appropriate PPE.  provided education on [facility] Postpartum  Home Visit Program.  Family was undecided on need for home visit. No referral will be made at this time. Family has this 's contact information should they decide to participate in program.    Patient given informational packet on  mood & anxiety disorders (resources/education). Family denies any additional needs from  at this time. Family has 's contact information should any needs/questions arise.     ZAIRE Carmen-SWAPNA  [facility]   [phone]

## 2021-10-01 NOTE — ANESTHESIA PREPROCEDURE EVALUATION
Relevant Problems   No relevant active problems       Anesthetic History          Comments: None prior, no family problems known with GA     Review of Systems / Medical History  Patient summary reviewed and pertinent labs reviewed    Pulmonary  Within defined limits                 Neuro/Psych   Within defined limits           Cardiovascular                  Exercise tolerance: >4 METS     GI/Hepatic/Renal                Endo/Other    Diabetes (gestational)    Obesity     Other Findings              Physical Exam    Airway  Mallampati: II  TM Distance: 4 - 6 cm  Neck ROM: normal range of motion   Mouth opening: Normal     Cardiovascular    Rhythm: regular  Rate: normal         Dental         Pulmonary  Breath sounds clear to auscultation               Abdominal         Other Findings            Anesthetic Plan    ASA: 2  Anesthesia type: epidural            Anesthetic plan and risks discussed with: Patient and Spouse      Fetal intolerance of labor, plan C/section with in situ epidural catheter, will dose slower with hand weakness and 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

## 2021-10-01 NOTE — L&D DELIVERY NOTE
Delivery Summary    Patient: Katie Allan MRN: [id number]  SSN: [id number]    YOB: 1995  Age: 23 y.o. Sex: female        Information for the patient's :  Darryl Regalado [[id number]]       Labor Events:    Labor: No    Steroids: None   Cervical Ripening Date/Time:       Cervical Ripening Type: None   Antibiotics During Labor: Yes   Rupture Identifier: Sac 1    Rupture Date/Time: 2021 5:40 PM   Rupture Type: AROM   Amniotic Fluid Volume: Scant    Amniotic Fluid Description: Clear    Amniotic Fluid Odor: None    Induction: Oxytocin       Induction Date/Time: 2021 3:50 PM    Indications for Induction:      Augmentation: AROM   Augmentation Date/Time: 15:40 PM   Indications for Augmentation:     Labor complications: Fetal Intolerance       Additional complications:          Delivery Date: 2021    Delivery Time: 9:22 PM   Delivery Type: , Low Transverse     Details    Trial of Labor: Yes   Primary/Repeat: Primary   Priority: Emergency   Indications:  Fetal Intolerance of Labor       Sex:  Male     Gestational Age: 37w0d  Delivery Clinician:  Monalisa Kimble  Living Status: Living   Delivery Location: L&D  OR            APGARS  One minute Five minutes Ten minutes   Skin color: 1   1        Heart rate: 2   2        Grimace: 2   2        Muscle tone: 1   2        Breathin   2        Totals: 8   9          Presentation: Vertex    Position:        Resuscitation Method:  Suctioning-bulb; Tactile Stimulation     Meconium Stained: None      Cord Information: 3 Vessels  Complications:    Cord around:    Delayed cord clamping?  Yes  Cord clamped date/time:2021  9:23 PM  Disposition of Cord Blood: Lab    Blood Gases Sent?: Yes    Placenta:  Date/Time: 2021  9:23 PM  Removal: Spontaneous      Appearance: Normal     [place] Measurements:  Birth Weight: 7 lb 6.9 oz (3.37 kg)      Birth Length: 52 cm      Head Circumference: 35 cm Chest Circumference: 33.5 cm     Abdominal Girth:       Other Providers:   NIKOLAY Eduardo;JAYDEN ABARCA;PACO PURCELL;MILTON HURD;YEISON RÍOS IV;IVONNE MARINELLI;KAYLIE ROSADO;PALOMA HERNANDEZ, Obstetrician;Primary Nurse;Primary [place] Nurse;Neonatologist;Anesthesiologist;Crna;Scrub Tech;Scrub Tech       Group B Strep:   Lab Results   Component Value Date/Time    GrBStrep, External POSITIVE 2021 12:00 AM     Information for the patient's :  Jimmy Gong [[id number]]   No results found for: MIHAI Carrillo, LEEANNA     Recent Labs     21   PCO2CB 36 49   PO2CB 28 11   HCO3I  --  27.1*   SO2I  --  9.7*   IBD 1.9  --    SPECTI VENOUS CORD ARTERIAL CORD   PHICB 7.40* 7.35   IDEV ROOM AIR ROOM AIR   IALLEN NOT APPLICABLE NOT APPLICABLE

## 2021-10-01 NOTE — PROGRESS NOTES
Admission assessment complete as noted. Patient oriented to room and unit. Plan of care reviewed and patient verbalizes understanding. Questions encouraged and answered. Patent encouraged to call for needs or concerns. Safety Teaching reviewed:   1. Hand hygiene prior to handling the infant. 2. Use of bulb syringe. 3. Bracelets with matching numbers are placed on mother and infant  4. An infant security tag  [facility]) is placed on the infant's ankle and monitored  5. All OB nurses wear pink Employee badges - do not give your baby to anyone without proper identification. 6. Never leave the baby alone in the room. 7. The infant should be placed on their back to sleep. on a firm mattress. No toys should be placed in the crib. (safe sleep video offered to view)  8. Never shake the baby (video offered to view)  9. Infant fall prevention - do not sleep with the baby, and place the baby in the crib while ambulating. 8. Mother and Baby Care booklet given to Mother.

## 2021-10-01 NOTE — PROGRESS NOTES
SBAR IN Report: Mother    Verbal report received from Jaime Bear RN  on this patient, who is now being transferred from labor and delivery for routine progression of care. The patient is wearing a green \"Anesthesia-Duramorph\" band. Report consisted of patient's Situation, Background, Assessment and Recommendations (SBAR). [place] ID bands were compared with the identification form, and verified with the patient and transferring nurse. Information from the SBAR, Kardex, OR Summary, Intake/Output, MAR and Recent Results and the [place] Report was reviewed with the transferring nurse; opportunity for questions and clarification provided.

## 2021-10-02 VITALS
SYSTOLIC BLOOD PRESSURE: 111 MMHG | RESPIRATION RATE: 19 BRPM | HEIGHT: 65 IN | TEMPERATURE: 98.7 F | WEIGHT: 219 LBS | OXYGEN SATURATION: 97 % | HEART RATE: 78 BPM | BODY MASS INDEX: 36.49 KG/M2 | DIASTOLIC BLOOD PRESSURE: 65 MMHG

## 2021-10-02 PROCEDURE — 74011250637 HC RX REV CODE- 250/637: Performed by: OBSTETRICS & GYNECOLOGY

## 2021-10-02 RX ORDER — OXYCODONE HYDROCHLORIDE 5 MG/1
5 TABLET ORAL
Qty: 20 TABLET | Refills: 0 | Status: SHIPPED | OUTPATIENT
Start: 2021-10-02 | End: 2021-10-09

## 2021-10-02 RX ORDER — IBUPROFEN 800 MG/1
800 TABLET ORAL
Qty: 60 TABLET | Refills: 0 | Status: SHIPPED | OUTPATIENT
Start: 2021-10-02

## 2021-10-02 RX ADMIN — SIMETHICONE 80 MG: 80 TABLET, CHEWABLE ORAL at 13:28

## 2021-10-02 RX ADMIN — SIMETHICONE 80 MG: 80 TABLET, CHEWABLE ORAL at 07:14

## 2021-10-02 RX ADMIN — OXYCODONE 10 MG: 5 TABLET ORAL at 08:55

## 2021-10-02 RX ADMIN — DOCUSATE SODIUM 100 MG: 100 CAPSULE, LIQUID FILLED ORAL at 07:14

## 2021-10-02 RX ADMIN — IBUPROFEN 600 MG: 600 TABLET ORAL at 00:06

## 2021-10-02 RX ADMIN — IBUPROFEN 600 MG: 600 TABLET ORAL at 13:28

## 2021-10-02 RX ADMIN — IBUPROFEN 600 MG: 600 TABLET ORAL at 07:14

## 2021-10-02 RX ADMIN — OXYCODONE 10 MG: 5 TABLET ORAL at 04:36

## 2021-10-02 NOTE — LACTATION NOTE
This note was copied from a baby's chart. In to see mom and infant for discharge. Mom states infant latching and feeding well overall. Mom feeding baby during visit. Reviewed discharge info and how to manage period of engorgement. Assisted mom w/ latching baby to left breast in cross cradle method. Good latch. Answered parent's breast feeding questions. No further needs.

## 2021-10-02 NOTE — PROGRESS NOTES
Post-Operative Day Number 2 Progress Note  Maryanne Dickinson  [id number]    Patient doing well post-op day 2 from  delivery without significant complaints. Pain controlled on current medication. Voiding without difficulty, normal lochia. Tolerating regular diet    Vitals:  Patient Vitals for the past 8 hrs:   BP Temp Pulse Resp SpO2   10/02/21 1300 111/65  78     10/02/21 0843 (!) 130/91 98.7 °F (37.1 °C) (!) 102 19 97 %     Temp (24hrs), Av.4 °F (36.9 °C), Min:98.1 °F (36.7 °C), Max:98.7 °F (37.1 °C)      Vital signs stable, afebrile. [place] bp a few min after the above was 111/65  Fluid bal -1440    Exam:  Patient without distress. Abdomen soft, fundus firm at level of umbilicus, nontender. Mildly distended and tympanitic. Incision dry and  clean without erythema. Labs: No results found for this or any previous visit (from the past 24 hour(s)). Assessment and Plan:  Patient appears to be having uncomplicated post- course. Continue routine post-op care and maternal education. Wants to go home today. No hx elevated bp's. Rec as little use of narcotic as poss, and daily miralax until nl gi fxn restored.

## 2021-10-02 NOTE — LACTATION NOTE

## 2021-10-02 NOTE — DISCHARGE INSTRUCTIONS

## 2021-10-02 NOTE — DISCHARGE SUMMARY
Obstetrical Discharge Summary     Name: Bari Johnson MRN: [id number]  SSN: [id number]    YOB: 1995  Age: 23 y.o. Sex: female      Allergies: Patient has no known allergies. Admit Date: 2021    Discharge Date: 10/2/2021     Admitting Physician: Lucho Marin MD     Attending Physician:  Devan Portillo MD     * Admission Diagnoses: Abdominal pain during pregnancy in third trimester [O26.893, R10.9]    * Discharge Diagnoses:   Information for the patient's :  Jace Martinez [[id number]]   Delivery of a 7 lb 6.9 oz (3.37 kg) male infant via , Low Transverse on 2021 at 9:22 PM  by Ra Sarmiento. Apgars were 8  and 9 . Additional Diagnoses:   Hospital Problems as of 10/2/2021 Date Reviewed: 2021        Codes Class Noted - Resolved POA    Abdominal pain during pregnancy in third trimester ICD-10-CM: O26.893, R10.9  ICD-9-CM: 646.83, 789.00  2021 - Present Unknown             Lab Results   Component Value Date/Time    ABO/Rh(D) O POSITIVE 2021 03:28 PM    GrBStrep, External POSITIVE 2021 12:00 AM      Immunization History   Administered Date(s) Administered    Tdap 2021       * Procedures:   Procedure(s):   SECTION           * Discharge Condition: good    * Hospital Course: Normal hospital course following the delivery. * Disposition: Home    Discharge Medications:   Current Discharge Medication List      START taking these medications    Details   ibuprofen (MOTRIN) 800 mg tablet Take 1 Tablet by mouth every eight (8) hours as needed for Pain. Qty: 60 Tablet, Refills: 0  Start date: 10/2/2021      oxyCODONE IR (ROXICODONE) 5 mg immediate release tablet Take 1 Tablet by mouth every eight (8) hours as needed for Pain for up to 7 days.  Max Daily Amount: 15 mg.  Qty: 20 Tablet, Refills: 0  Start date: 10/2/2021, End date: 10/9/2021    Associated Diagnoses: Status post  section         CONTINUE these medications which have NOT CHANGED    Details   famotidine (PEPCID) 10 mg tablet Take 10 mg by mouth two (2) times a day. prenatal vit/iron fum/folic ac (PRENATAL 1+1 PO) Take  by mouth. BIOTIN PO Take  by mouth.      calcium-cholecalciferol, d3, (CALCIUM 600 + D) 600-125 mg-unit tab Take  by mouth. STOP taking these medications       lancets misc Comments:   Reason for Stopping:         glucose blood VI test strips (blood glucose test) strip Comments:   Reason for Stopping:         Blood-Glucose Meter monitoring kit Comments:   Reason for Stopping:               * Follow-up Care/Patient Instructions:   Activity: No sex for 6 weeks, No driving while on analgesics and No heavy lifting for 6 weeks  Diet: Regular Diet  Wound Care: As directed    Follow-up Information     Follow up With Specialties Details Why Contact Info    Bib Martinez MD Family Medicine   [address] [facility] [address] [facility] [address]  [phone]

## (undated) DEVICE — REM POLYHESIVE ADULT PATIENT RETURN ELECTRODE: Brand: VALLEYLAB

## (undated) DEVICE — TRAY CATH 16FR PVC INTMIT STR TIP PREATTACH TO 1000ML COLL

## (undated) DEVICE — PREMIUM WET SKIN PREP TRAY: Brand: MEDLINE INDUSTRIES, INC.

## (undated) DEVICE — SURGICAL PROCEDURE PACK C SECT CDS

## (undated) DEVICE — Device: Brand: PORTEX

## (undated) DEVICE — SUTURE PDS II SZ 0 L27IN ABSRB VLT L36MM CT-1 1/2 CIR Z340H

## (undated) DEVICE — SUTURE VCRL SZ 0 L36IN ABSRB UD L36MM CT-1 1/2 CIR J946H

## (undated) DEVICE — KENDALL SCD EXPRESS SLEEVES, KNEE LENGTH, MEDIUM: Brand: KENDALL SCD

## (undated) DEVICE — SUTURE VCRL SZ 1 L27IN ABSRB UD CT-1 L36MM 1/2 CIR J261H

## (undated) DEVICE — AMD ANTIMICROBIAL NON-ADHERENT PAD,0.2% POLYHEXAMETHYLENE BIGUANIDE HCI (PHMB): Brand: TELFA

## (undated) DEVICE — SUTURE MCRYL SZ 4-0 L27IN ABSRB UD L19MM PS-2 1/2 CIR PRIM Y426H

## (undated) DEVICE — MEDI-VAC NON-CONDUCTIVE SUCTION TUBING: Brand: CARDINAL HEALTH

## (undated) DEVICE — AMD ANTIMICROBIAL GAUZE SPONGES,12 PLY USP TYPE VII, 0.2% POLYHEXAMETHYLENE BIGUANIDE HCI (PHMB): Brand: CURITY

## (undated) DEVICE — PENCIL ES L3M BTTN SWCH S STL HEX LOK BLDE ELECTRD HOLSTER